# Patient Record
Sex: MALE
[De-identification: names, ages, dates, MRNs, and addresses within clinical notes are randomized per-mention and may not be internally consistent; named-entity substitution may affect disease eponyms.]

---

## 2023-01-27 ENCOUNTER — NON-APPOINTMENT (OUTPATIENT)
Age: 73
End: 2023-01-27

## 2023-01-27 PROBLEM — Z00.00 ENCOUNTER FOR PREVENTIVE HEALTH EXAMINATION: Status: ACTIVE | Noted: 2023-01-27

## 2023-01-30 ENCOUNTER — APPOINTMENT (OUTPATIENT)
Dept: COLORECTAL SURGERY | Facility: CLINIC | Age: 73
End: 2023-01-30
Payer: MEDICARE

## 2023-01-30 ENCOUNTER — NON-APPOINTMENT (OUTPATIENT)
Age: 73
End: 2023-01-30

## 2023-01-30 VITALS
HEART RATE: 65 BPM | DIASTOLIC BLOOD PRESSURE: 80 MMHG | TEMPERATURE: 97.7 F | SYSTOLIC BLOOD PRESSURE: 153 MMHG | HEIGHT: 64 IN | BODY MASS INDEX: 26.12 KG/M2 | WEIGHT: 153 LBS

## 2023-01-30 PROCEDURE — 99205 OFFICE O/P NEW HI 60 MIN: CPT

## 2023-01-30 NOTE — HISTORY OF PRESENT ILLNESS
[FreeTextEntry1] : 73 y/o M presents for initial evaluation of colon CA, referred by Rabbi Duggan\par PMH HLD\par PSH inguinal hernia repair\par (+) Father diagnosed w/ CRC age 70s, also w/ brain CA\par \par Pt underwent colonoscopy w/ Dr. Yasir Awad at [facility] on 1/13/23: Bowel prep adequate\par Impression: \par Medium sized lipoma cecum. Biopsied. \par A large flat polypoid lesion with scarring and central umbilication was found at the splenic flexure, just proximal to a large tattoo. Biopsied. \par 3 mm polyp found in the proximal descending colon, s/p resection.\par Exam otherwise w/o abnormality\par \par Pathology performed [facility]: \par Colon,Splenic flexure, polyp biopsy: Invasive moderately differentiated adenocarcinoma.  \par Tumor arises in a background  of tubular adenoma. \par MSI-stable \par \par CT chest performed at [facility] on 1/26/23\par Severe burden of calcific coronary ahterosclerosis.\par Impression: \par Small solid noncalcified lung nodules are nonspecific, although give the presence of numerous calcified granulomas may be postinflammatory in etiology. 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 No extra colonic spread of tumor. Possible bone metastases left ilium. \par \par Most recent labs (1/23/23) otherwise WNL: Cr 0.94, AST 32, ALT 29, Bilirubin Total 0.4, Alkaline phosphatase 86, Albumin 2.2. Hgb 14.4, HCT 42.5\par \par

## 2023-01-30 NOTE — ASSESSMENT
[FreeTextEntry1] : I had extensive discussion with the patient (60 minutes) regarding the diagnosis and treatment options. I recommended that he consider proceeding with a robotic assisted left hemicolectomy possible extended right hemicolectomy.\par The associated risks, benefits, alternatives of the procedure have been outlined discussed and reviewed with the patient's family. These risks including but not limited to bleeding, infection, anastomotic leak, need for secondary surgery, need for ileostomy or colostomy creation, change in bowel habits,  as well as the risk of heart and lung complications infection and death were detailed. The patient understands these risks and consents the planned procedure. Appropriate  literature regarding surgery and post operative treatment/complications and enhanced recovery pathway has been detailed and reviewed. Consent was obtained. All questions were answered.\par

## 2023-02-03 ENCOUNTER — OUTPATIENT (OUTPATIENT)
Dept: OUTPATIENT SERVICES | Facility: HOSPITAL | Age: 73
LOS: 1 days | End: 2023-02-03
Payer: MEDICARE

## 2023-02-03 ENCOUNTER — RESULT REVIEW (OUTPATIENT)
Age: 73
End: 2023-02-03

## 2023-02-03 DIAGNOSIS — C18.5 MALIGNANT NEOPLASM OF SPLENIC FLEXURE: ICD-10-CM

## 2023-02-03 PROCEDURE — 88321 CONSLTJ&REPRT SLD PREP ELSWR: CPT

## 2023-02-07 LAB — SURGICAL PATHOLOGY STUDY: SIGNIFICANT CHANGE UP

## 2023-02-20 RX ORDER — CIPROFLOXACIN HYDROCHLORIDE 500 MG/1
500 TABLET, FILM COATED ORAL
Qty: 2 | Refills: 0 | Status: ACTIVE | COMMUNITY
Start: 2023-01-30 | End: 1900-01-01

## 2023-02-20 RX ORDER — METRONIDAZOLE 500 MG/1
500 TABLET ORAL
Qty: 6 | Refills: 0 | Status: ACTIVE | COMMUNITY
Start: 2023-01-30 | End: 1900-01-01

## 2023-02-21 ENCOUNTER — TRANSCRIPTION ENCOUNTER (OUTPATIENT)
Age: 73
End: 2023-02-21

## 2023-02-21 VITALS
HEART RATE: 62 BPM | DIASTOLIC BLOOD PRESSURE: 80 MMHG | TEMPERATURE: 98 F | RESPIRATION RATE: 16 BRPM | OXYGEN SATURATION: 96 % | SYSTOLIC BLOOD PRESSURE: 145 MMHG | WEIGHT: 147.71 LBS | HEIGHT: 64 IN

## 2023-02-21 NOTE — PATIENT PROFILE ADULT - FALL HARM RISK - UNIVERSAL INTERVENTIONS
Bed in lowest position, wheels locked, appropriate side rails in place/Call bell, personal items and telephone in reach/Instruct patient to call for assistance before getting out of bed or chair/Non-slip footwear when patient is out of bed/[place] to call system/Physically safe environment - no spills, clutter or unnecessary equipment/Purposeful Proactive Rounding/Room/bathroom lighting operational, light cord in reach

## 2023-02-21 NOTE — H&P ADULT - HISTORY OF PRESENT ILLNESS
This is a 73yo gentleman who underwent surveillance colonoscopy ( last Cscope 3  yrs ago) for polyps.  A large flat polyp was found at the splenic flexure, biopsy   moderately differentiated adenoca. MMR intact. The lesion has been tattooed.  CT abd - no distant disease. CT chest  small non specifc lung nodules    further PETCT showed these to be  non FDG avid nodules.

## 2023-02-21 NOTE — H&P ADULT - ASSESSMENT
This is a 72year old  patient with splenic flexure cancer; moderately differentiated adnoca , MMR intact.   The lesion has been tattooed.  Staging CT scan and PETCT does not show  gross distant disease. He is medically healthy.   He is here for Robotic assisted  Left hemicolectomy  ,possible  extended right hemicolectomy.   1. Type/Screen 2 . Heparin 5000u sc 3. ERP postop

## 2023-02-21 NOTE — H&P ADULT - NSHPPHYSICALEXAM_GEN_ALL_CORE
Gastrointestinal: No abdominal masses. No abdominal tenderness.   Liver:. no hepatosplenomegaly.   Neck: no jugular venous distention.   Respiratory: Normal breath sounds.   Skin:. no rash or lesion.   Neurologic: alert.   Psychiatric: calm.

## 2023-02-21 NOTE — H&P ADULT - REASON FOR ADMISSION
Elective admission for Robotic assist  left hemicolectomy / ? extended right hemicolectomy for Splenic Flexure cancer

## 2023-02-21 NOTE — PRE-OP CHECKLIST - PATIENT'S PERSONAL PROPERTY GIVEN TO
2 bags on unit, valuables with wife/family member/on unit 3 bags on unit, valuables with wife/family member/on unit

## 2023-02-21 NOTE — H&P ADULT - NSHPLABSRESULTS_GEN_ALL_CORE
Cr 0.94, AST 32, ALT 29, Bilirubin Total 0.4, Alkaline phosphatase 86, Albumin 2.2. Hgb 14.4, HCT 42.5

## 2023-02-22 ENCOUNTER — RESULT REVIEW (OUTPATIENT)
Age: 73
End: 2023-02-22

## 2023-02-22 ENCOUNTER — INPATIENT (INPATIENT)
Facility: HOSPITAL | Age: 73
LOS: 1 days | Discharge: HOME | DRG: 330 | End: 2023-02-24
Attending: SURGERY | Admitting: SURGERY
Payer: MEDICARE

## 2023-02-22 ENCOUNTER — APPOINTMENT (OUTPATIENT)
Dept: COLORECTAL SURGERY | Facility: CLINIC | Age: 73
End: 2023-02-22

## 2023-02-22 ENCOUNTER — TRANSCRIPTION ENCOUNTER (OUTPATIENT)
Age: 73
End: 2023-02-22

## 2023-02-22 DIAGNOSIS — E87.1 HYPO-OSMOLALITY AND HYPONATREMIA: ICD-10-CM

## 2023-02-22 DIAGNOSIS — Z88.0 ALLERGY STATUS TO PENICILLIN: ICD-10-CM

## 2023-02-22 DIAGNOSIS — C18.5 MALIGNANT NEOPLASM OF SPLENIC FLEXURE: ICD-10-CM

## 2023-02-22 DIAGNOSIS — Z98.890 OTHER SPECIFIED POSTPROCEDURAL STATES: Chronic | ICD-10-CM

## 2023-02-22 DIAGNOSIS — G47.00 INSOMNIA, UNSPECIFIED: ICD-10-CM

## 2023-02-22 DIAGNOSIS — Z79.899 OTHER LONG TERM (CURRENT) DRUG THERAPY: ICD-10-CM

## 2023-02-22 DIAGNOSIS — E78.5 HYPERLIPIDEMIA, UNSPECIFIED: ICD-10-CM

## 2023-02-22 DIAGNOSIS — R91.8 OTHER NONSPECIFIC ABNORMAL FINDING OF LUNG FIELD: ICD-10-CM

## 2023-02-22 LAB
BLD GP AB SCN SERPL QL: NEGATIVE — SIGNIFICANT CHANGE UP
RH IG SCN BLD-IMP: POSITIVE — SIGNIFICANT CHANGE UP

## 2023-02-22 PROCEDURE — 44204 LAPARO PARTIAL COLECTOMY: CPT | Mod: GC

## 2023-02-22 PROCEDURE — 44213 LAP MOBIL SPLENIC FL ADD-ON: CPT | Mod: GC

## 2023-02-22 PROCEDURE — 88309 TISSUE EXAM BY PATHOLOGIST: CPT | Mod: 26

## 2023-02-22 DEVICE — CLIP APPLIER ETHICON LIGACLIP 11.5" MEDIUM: Type: IMPLANTABLE DEVICE | Site: RIGHT | Status: FUNCTIONAL

## 2023-02-22 DEVICE — XI STAPLER SUREFORM RELOAD 60 WHITE: Type: IMPLANTABLE DEVICE | Site: RIGHT | Status: FUNCTIONAL

## 2023-02-22 DEVICE — XI STAPLER SUREFORM RELOAD 60 BLUE: Type: IMPLANTABLE DEVICE | Site: RIGHT | Status: FUNCTIONAL

## 2023-02-22 RX ORDER — ATORVASTATIN CALCIUM 80 MG/1
20 TABLET, FILM COATED ORAL AT BEDTIME
Refills: 0 | Status: DISCONTINUED | OUTPATIENT
Start: 2023-02-22 | End: 2023-02-24

## 2023-02-22 RX ORDER — ACETAMINOPHEN 500 MG
1000 TABLET ORAL ONCE
Refills: 0 | Status: COMPLETED | OUTPATIENT
Start: 2023-02-22 | End: 2023-02-22

## 2023-02-22 RX ORDER — ACETAMINOPHEN 500 MG
1000 TABLET ORAL EVERY 6 HOURS
Refills: 0 | Status: DISCONTINUED | OUTPATIENT
Start: 2023-02-22 | End: 2023-02-24

## 2023-02-22 RX ORDER — SODIUM CHLORIDE 9 MG/ML
1000 INJECTION, SOLUTION INTRAVENOUS
Refills: 0 | Status: DISCONTINUED | OUTPATIENT
Start: 2023-02-22 | End: 2023-02-24

## 2023-02-22 RX ORDER — HYDRALAZINE HCL 50 MG
5 TABLET ORAL ONCE
Refills: 0 | Status: COMPLETED | OUTPATIENT
Start: 2023-02-22 | End: 2023-02-22

## 2023-02-22 RX ORDER — KETOROLAC TROMETHAMINE 30 MG/ML
15 SYRINGE (ML) INJECTION EVERY 6 HOURS
Refills: 0 | Status: DISCONTINUED | OUTPATIENT
Start: 2023-02-22 | End: 2023-02-24

## 2023-02-22 RX ORDER — ACETAMINOPHEN 500 MG
1000 TABLET ORAL EVERY 6 HOURS
Refills: 0 | Status: DISCONTINUED | OUTPATIENT
Start: 2023-02-22 | End: 2023-02-22

## 2023-02-22 RX ORDER — HEPARIN SODIUM 5000 [USP'U]/ML
5000 INJECTION INTRAVENOUS; SUBCUTANEOUS ONCE
Refills: 0 | Status: COMPLETED | OUTPATIENT
Start: 2023-02-22 | End: 2023-02-22

## 2023-02-22 RX ORDER — FLUMAZENIL 0.1 MG/ML
0.2 VIAL (ML) INTRAVENOUS ONCE
Refills: 0 | Status: COMPLETED | OUTPATIENT
Start: 2023-02-22 | End: 2023-02-22

## 2023-02-22 RX ORDER — NALOXONE HYDROCHLORIDE 4 MG/.1ML
0.04 SPRAY NASAL ONCE
Refills: 0 | Status: COMPLETED | OUTPATIENT
Start: 2023-02-22 | End: 2023-02-22

## 2023-02-22 RX ORDER — HEPARIN SODIUM 5000 [USP'U]/ML
5000 INJECTION INTRAVENOUS; SUBCUTANEOUS EVERY 8 HOURS
Refills: 0 | Status: DISCONTINUED | OUTPATIENT
Start: 2023-02-22 | End: 2023-02-24

## 2023-02-22 RX ORDER — ONDANSETRON 8 MG/1
4 TABLET, FILM COATED ORAL EVERY 6 HOURS
Refills: 0 | Status: DISCONTINUED | OUTPATIENT
Start: 2023-02-22 | End: 2023-02-24

## 2023-02-22 RX ORDER — LANOLIN ALCOHOL/MO/W.PET/CERES
3 CREAM (GRAM) TOPICAL AT BEDTIME
Refills: 0 | Status: COMPLETED | OUTPATIENT
Start: 2023-02-22 | End: 2023-02-22

## 2023-02-22 RX ORDER — ROSUVASTATIN CALCIUM 5 MG/1
1 TABLET ORAL
Qty: 0 | Refills: 0 | DISCHARGE

## 2023-02-22 RX ADMIN — HEPARIN SODIUM 5000 UNIT(S): 5000 INJECTION INTRAVENOUS; SUBCUTANEOUS at 20:01

## 2023-02-22 RX ADMIN — Medication 1000 MILLIGRAM(S): at 23:22

## 2023-02-22 RX ADMIN — Medication 0.2 MILLIGRAM(S): at 12:25

## 2023-02-22 RX ADMIN — Medication 1000 MILLIGRAM(S): at 07:15

## 2023-02-22 RX ADMIN — ATORVASTATIN CALCIUM 20 MILLIGRAM(S): 80 TABLET, FILM COATED ORAL at 21:50

## 2023-02-22 RX ADMIN — Medication 5 MILLIGRAM(S): at 15:08

## 2023-02-22 RX ADMIN — Medication 3 MILLIGRAM(S): at 23:37

## 2023-02-22 RX ADMIN — NALOXONE HYDROCHLORIDE 0.04 MILLIGRAM(S): 4 SPRAY NASAL at 12:30

## 2023-02-22 RX ADMIN — Medication 1000 MILLIGRAM(S): at 18:27

## 2023-02-22 RX ADMIN — HEPARIN SODIUM 5000 UNIT(S): 5000 INJECTION INTRAVENOUS; SUBCUTANEOUS at 07:15

## 2023-02-22 RX ADMIN — Medication 15 MILLIGRAM(S): at 16:10

## 2023-02-22 RX ADMIN — Medication 15 MILLIGRAM(S): at 15:43

## 2023-02-22 RX ADMIN — Medication 15 MILLIGRAM(S): at 22:06

## 2023-02-22 RX ADMIN — Medication 15 MILLIGRAM(S): at 21:51

## 2023-02-22 NOTE — BRIEF OPERATIVE NOTE - NSICDXBRIEFPREOP_GEN_ALL_CORE_FT
PRE-OP DIAGNOSIS:  Colonic mass 22-Feb-2023 11:48:18  Suzanna Herrera  
PRE-OP DIAGNOSIS:  Cancer of splenic flexure 21-Feb-2023 19:13:15  Aleksandar Ventura

## 2023-02-22 NOTE — CHART NOTE - NSCHARTNOTEFT_GEN_A_CORE
Received from OR accompanied by anesthesia. Per report, patient sleepy after extubation.  Nasal trumpet in left nare on arrival to PACU.  Informed by RN that patient not waking up/following commands. Seen at bedside, noted minimal waveform on etCO2, airway maintained with support.  Dr. Farnsworth called immediately to bedside, flumazenil and narcan given per order. Oral airway placed by Dr. Farnsworth. Dr. Mora came to bedside.  Noted improved etCO2 waveforms after administration.  Now opens eyes, moving all extremities.  Team aware of above.  Dispo regional when PACU criteria met.

## 2023-02-22 NOTE — PRE-ANESTHESIA EVALUATION ADULT - NSANTHPMHFT_GEN_ALL_CORE
Cardiac: Positive for HLD. Denies HTN, MI/Angina/Heart Failure, Arrhythmia, Murmur/Valvular Disorder. >4 METS  Pulmonary: Denies COPD, PAULA, Asthma  Renal: Denies kidney dysfunction  Hepatic: Denies liver dysfunction  Gastrointestinal: Positive for malignant neoplasm of splenic flexure. Denies GERD/IBD.  Endocrine: Denies DM or thyroid dysfunction  Neurologic: Denies stroke/seizure history  Hematologic: Denies anemia, blood clotting disorder, blood thinning medication.    PSH: Right inguinal hernia repair

## 2023-02-22 NOTE — BRIEF OPERATIVE NOTE - OPERATION/FINDINGS
Veress needle. Optiview entry. Under visualisation , rest of ports inserted. Bilateral BENJAMIN block. Small bowel stacked away. Patient positioned. Robot docked and targeted. Medial to lateral dissection of descending colon. Splenic mobilisation .  Closure in usual manner.
No qualified resident for bedside assist available. Veress needle access. Optiview placement of 12mm port; rest of ports placed under direct visualization. TAP block. Left colon and splenic flexure mobilized. High ligation of middle colic with Vessel sealer. Proximal and distal margins taken with robotic stapler blue load. transverse to descending colon anastomosis created with robotic stapler white load x 1 fire. Common channel closed with V-lock running suture and Lemberted. Hemostatic. 8cm off midline incision created in RLQ and specimen removed. Closing tray utilized; gowns and gloves changed. Peritoneum closed with 2-0 Vicryl running; fascia closed with #1 PDS running. Closed skin with 3-0 Vicryl deep dermal and 4-0 Monocryl.

## 2023-02-22 NOTE — BRIEF OPERATIVE NOTE - NSICDXBRIEFPROCEDURE_GEN_ALL_CORE_FT
PROCEDURES:  Robot-assisted left hemicolectomy 21-Feb-2023 19:15:29  Aleksandar Ventura  
PROCEDURES:  Robot-assisted left hemicolectomy 21-Feb-2023 19:15:29  Aleksandar Ventura

## 2023-02-22 NOTE — BRIEF OPERATIVE NOTE - NSICDXBRIEFPOSTOP_GEN_ALL_CORE_FT
POST-OP DIAGNOSIS:  Cancer of splenic flexure 21-Feb-2023 19:13:22  Aleksandar Ventura  
POST-OP DIAGNOSIS:  Colonic mass 22-Feb-2023 11:48:30  Suzanna Herrera

## 2023-02-23 LAB
ANION GAP SERPL CALC-SCNC: 13 MMOL/L — SIGNIFICANT CHANGE UP (ref 5–17)
BUN SERPL-MCNC: 7 MG/DL — SIGNIFICANT CHANGE UP (ref 7–23)
CALCIUM SERPL-MCNC: 8.8 MG/DL — SIGNIFICANT CHANGE UP (ref 8.4–10.5)
CHLORIDE SERPL-SCNC: 96 MMOL/L — SIGNIFICANT CHANGE UP (ref 96–108)
CO2 SERPL-SCNC: 25 MMOL/L — SIGNIFICANT CHANGE UP (ref 22–31)
CREAT SERPL-MCNC: 0.84 MG/DL — SIGNIFICANT CHANGE UP (ref 0.5–1.3)
EGFR: 93 ML/MIN/1.73M2 — SIGNIFICANT CHANGE UP
GLUCOSE SERPL-MCNC: 119 MG/DL — HIGH (ref 70–99)
HCT VFR BLD CALC: 36.3 % — LOW (ref 39–50)
HGB BLD-MCNC: 12.1 G/DL — LOW (ref 13–17)
MAGNESIUM SERPL-MCNC: 1.6 MG/DL — SIGNIFICANT CHANGE UP (ref 1.6–2.6)
MCHC RBC-ENTMCNC: 28.4 PG — SIGNIFICANT CHANGE UP (ref 27–34)
MCHC RBC-ENTMCNC: 33.3 GM/DL — SIGNIFICANT CHANGE UP (ref 32–36)
MCV RBC AUTO: 85.2 FL — SIGNIFICANT CHANGE UP (ref 80–100)
NRBC # BLD: 0 /100 WBCS — SIGNIFICANT CHANGE UP (ref 0–0)
PHOSPHATE SERPL-MCNC: 2.9 MG/DL — SIGNIFICANT CHANGE UP (ref 2.5–4.5)
PLATELET # BLD AUTO: 129 K/UL — LOW (ref 150–400)
POTASSIUM SERPL-MCNC: 3.2 MMOL/L — LOW (ref 3.5–5.3)
POTASSIUM SERPL-SCNC: 3.2 MMOL/L — LOW (ref 3.5–5.3)
RBC # BLD: 4.26 M/UL — SIGNIFICANT CHANGE UP (ref 4.2–5.8)
RBC # FLD: 12.8 % — SIGNIFICANT CHANGE UP (ref 10.3–14.5)
SODIUM SERPL-SCNC: 134 MMOL/L — LOW (ref 135–145)
WBC # BLD: 8.71 K/UL — SIGNIFICANT CHANGE UP (ref 3.8–10.5)
WBC # FLD AUTO: 8.71 K/UL — SIGNIFICANT CHANGE UP (ref 3.8–10.5)

## 2023-02-23 RX ORDER — MAGNESIUM SULFATE 500 MG/ML
2 VIAL (ML) INJECTION EVERY 6 HOURS
Refills: 0 | Status: COMPLETED | OUTPATIENT
Start: 2023-02-23 | End: 2023-02-23

## 2023-02-23 RX ORDER — HYDROMORPHONE HYDROCHLORIDE 2 MG/ML
0.25 INJECTION INTRAMUSCULAR; INTRAVENOUS; SUBCUTANEOUS ONCE
Refills: 0 | Status: DISCONTINUED | OUTPATIENT
Start: 2023-02-23 | End: 2023-02-23

## 2023-02-23 RX ORDER — POTASSIUM CHLORIDE 20 MEQ
40 PACKET (EA) ORAL ONCE
Refills: 0 | Status: COMPLETED | OUTPATIENT
Start: 2023-02-23 | End: 2023-02-23

## 2023-02-23 RX ADMIN — Medication 15 MILLIGRAM(S): at 10:31

## 2023-02-23 RX ADMIN — Medication 1000 MILLIGRAM(S): at 13:20

## 2023-02-23 RX ADMIN — Medication 15 MILLIGRAM(S): at 23:15

## 2023-02-23 RX ADMIN — HEPARIN SODIUM 5000 UNIT(S): 5000 INJECTION INTRAVENOUS; SUBCUTANEOUS at 05:19

## 2023-02-23 RX ADMIN — Medication 40 MILLIEQUIVALENT(S): at 10:31

## 2023-02-23 RX ADMIN — Medication 1000 MILLIGRAM(S): at 00:24

## 2023-02-23 RX ADMIN — Medication 1000 MILLIGRAM(S): at 05:19

## 2023-02-23 RX ADMIN — Medication 1000 MILLIGRAM(S): at 23:03

## 2023-02-23 RX ADMIN — SODIUM CHLORIDE 40 MILLILITER(S): 9 INJECTION, SOLUTION INTRAVENOUS at 19:43

## 2023-02-23 RX ADMIN — Medication 15 MILLIGRAM(S): at 16:31

## 2023-02-23 RX ADMIN — Medication 40 MILLIEQUIVALENT(S): at 13:21

## 2023-02-23 RX ADMIN — Medication 1000 MILLIGRAM(S): at 19:37

## 2023-02-23 RX ADMIN — HYDROMORPHONE HYDROCHLORIDE 0.25 MILLIGRAM(S): 2 INJECTION INTRAMUSCULAR; INTRAVENOUS; SUBCUTANEOUS at 05:19

## 2023-02-23 RX ADMIN — Medication 15 MILLIGRAM(S): at 22:15

## 2023-02-23 RX ADMIN — Medication 25 GRAM(S): at 10:31

## 2023-02-23 RX ADMIN — Medication 25 GRAM(S): at 16:32

## 2023-02-23 RX ADMIN — ATORVASTATIN CALCIUM 20 MILLIGRAM(S): 80 TABLET, FILM COATED ORAL at 22:15

## 2023-02-23 RX ADMIN — HEPARIN SODIUM 5000 UNIT(S): 5000 INJECTION INTRAVENOUS; SUBCUTANEOUS at 13:21

## 2023-02-23 RX ADMIN — HEPARIN SODIUM 5000 UNIT(S): 5000 INJECTION INTRAVENOUS; SUBCUTANEOUS at 19:37

## 2023-02-24 ENCOUNTER — TRANSCRIPTION ENCOUNTER (OUTPATIENT)
Age: 73
End: 2023-02-24

## 2023-02-24 VITALS
RESPIRATION RATE: 18 BRPM | DIASTOLIC BLOOD PRESSURE: 81 MMHG | SYSTOLIC BLOOD PRESSURE: 165 MMHG | OXYGEN SATURATION: 96 % | HEART RATE: 69 BPM | TEMPERATURE: 98 F

## 2023-02-24 LAB
ANION GAP SERPL CALC-SCNC: 8 MMOL/L — SIGNIFICANT CHANGE UP (ref 5–17)
BUN SERPL-MCNC: 7 MG/DL — SIGNIFICANT CHANGE UP (ref 7–23)
CALCIUM SERPL-MCNC: 8.5 MG/DL — SIGNIFICANT CHANGE UP (ref 8.4–10.5)
CHLORIDE SERPL-SCNC: 104 MMOL/L — SIGNIFICANT CHANGE UP (ref 96–108)
CO2 SERPL-SCNC: 27 MMOL/L — SIGNIFICANT CHANGE UP (ref 22–31)
CREAT SERPL-MCNC: 0.95 MG/DL — SIGNIFICANT CHANGE UP (ref 0.5–1.3)
EGFR: 86 ML/MIN/1.73M2 — SIGNIFICANT CHANGE UP
GLUCOSE SERPL-MCNC: 109 MG/DL — HIGH (ref 70–99)
HCT VFR BLD CALC: 36.2 % — LOW (ref 39–50)
HGB BLD-MCNC: 12 G/DL — LOW (ref 13–17)
MAGNESIUM SERPL-MCNC: 2.4 MG/DL — SIGNIFICANT CHANGE UP (ref 1.6–2.6)
MCHC RBC-ENTMCNC: 28.8 PG — SIGNIFICANT CHANGE UP (ref 27–34)
MCHC RBC-ENTMCNC: 33.1 GM/DL — SIGNIFICANT CHANGE UP (ref 32–36)
MCV RBC AUTO: 86.8 FL — SIGNIFICANT CHANGE UP (ref 80–100)
NRBC # BLD: 0 /100 WBCS — SIGNIFICANT CHANGE UP (ref 0–0)
PHOSPHATE SERPL-MCNC: 1.2 MG/DL — LOW (ref 2.5–4.5)
PLATELET # BLD AUTO: 123 K/UL — LOW (ref 150–400)
POTASSIUM SERPL-MCNC: 3.5 MMOL/L — SIGNIFICANT CHANGE UP (ref 3.5–5.3)
POTASSIUM SERPL-SCNC: 3.5 MMOL/L — SIGNIFICANT CHANGE UP (ref 3.5–5.3)
RBC # BLD: 4.17 M/UL — LOW (ref 4.2–5.8)
RBC # FLD: 13.3 % — SIGNIFICANT CHANGE UP (ref 10.3–14.5)
SODIUM SERPL-SCNC: 139 MMOL/L — SIGNIFICANT CHANGE UP (ref 135–145)
SURGICAL PATHOLOGY STUDY: SIGNIFICANT CHANGE UP
WBC # BLD: 6.51 K/UL — SIGNIFICANT CHANGE UP (ref 3.8–10.5)
WBC # FLD AUTO: 6.51 K/UL — SIGNIFICANT CHANGE UP (ref 3.8–10.5)

## 2023-02-24 PROCEDURE — S2900: CPT

## 2023-02-24 PROCEDURE — C1889: CPT

## 2023-02-24 PROCEDURE — 88309 TISSUE EXAM BY PATHOLOGIST: CPT

## 2023-02-24 PROCEDURE — 84100 ASSAY OF PHOSPHORUS: CPT

## 2023-02-24 PROCEDURE — 86850 RBC ANTIBODY SCREEN: CPT

## 2023-02-24 PROCEDURE — 83735 ASSAY OF MAGNESIUM: CPT

## 2023-02-24 PROCEDURE — 80048 BASIC METABOLIC PNL TOTAL CA: CPT

## 2023-02-24 PROCEDURE — 82962 GLUCOSE BLOOD TEST: CPT

## 2023-02-24 PROCEDURE — 36415 COLL VENOUS BLD VENIPUNCTURE: CPT

## 2023-02-24 PROCEDURE — 85027 COMPLETE CBC AUTOMATED: CPT

## 2023-02-24 PROCEDURE — 86900 BLOOD TYPING SEROLOGIC ABO: CPT

## 2023-02-24 PROCEDURE — 86901 BLOOD TYPING SEROLOGIC RH(D): CPT

## 2023-02-24 RX ORDER — POTASSIUM PHOSPHATE, MONOBASIC POTASSIUM PHOSPHATE, DIBASIC 236; 224 MG/ML; MG/ML
21 INJECTION, SOLUTION INTRAVENOUS ONCE
Refills: 0 | Status: COMPLETED | OUTPATIENT
Start: 2023-02-24 | End: 2023-02-24

## 2023-02-24 RX ORDER — SODIUM,POTASSIUM PHOSPHATES 278-250MG
1 POWDER IN PACKET (EA) ORAL ONCE
Refills: 0 | Status: COMPLETED | OUTPATIENT
Start: 2023-02-24 | End: 2023-02-24

## 2023-02-24 RX ORDER — POTASSIUM CHLORIDE 20 MEQ
40 PACKET (EA) ORAL ONCE
Refills: 0 | Status: COMPLETED | OUTPATIENT
Start: 2023-02-24 | End: 2023-02-24

## 2023-02-24 RX ORDER — ACETAMINOPHEN 500 MG
650 TABLET ORAL EVERY 6 HOURS
Refills: 0 | Status: DISCONTINUED | OUTPATIENT
Start: 2023-02-24 | End: 2023-02-24

## 2023-02-24 RX ORDER — OXYCODONE HYDROCHLORIDE 5 MG/1
1 TABLET ORAL
Qty: 8 | Refills: 0
Start: 2023-02-24 | End: 2023-02-25

## 2023-02-24 RX ORDER — DOCUSATE SODIUM 100 MG
1 CAPSULE ORAL
Qty: 14 | Refills: 0
Start: 2023-02-24 | End: 2023-03-02

## 2023-02-24 RX ADMIN — HEPARIN SODIUM 5000 UNIT(S): 5000 INJECTION INTRAVENOUS; SUBCUTANEOUS at 05:14

## 2023-02-24 RX ADMIN — Medication 15 MILLIGRAM(S): at 05:14

## 2023-02-24 RX ADMIN — Medication 1000 MILLIGRAM(S): at 00:03

## 2023-02-24 RX ADMIN — Medication 40 MILLIEQUIVALENT(S): at 11:04

## 2023-02-24 RX ADMIN — Medication 1 PACKET(S): at 14:08

## 2023-02-24 RX ADMIN — Medication 15 MILLIGRAM(S): at 11:04

## 2023-02-24 RX ADMIN — Medication 1000 MILLIGRAM(S): at 05:16

## 2023-02-24 RX ADMIN — Medication 15 MILLIGRAM(S): at 06:14

## 2023-02-24 RX ADMIN — HEPARIN SODIUM 5000 UNIT(S): 5000 INJECTION INTRAVENOUS; SUBCUTANEOUS at 14:08

## 2023-02-24 RX ADMIN — Medication 1000 MILLIGRAM(S): at 06:16

## 2023-02-24 RX ADMIN — POTASSIUM PHOSPHATE, MONOBASIC POTASSIUM PHOSPHATE, DIBASIC 62.5 MILLIMOLE(S): 236; 224 INJECTION, SOLUTION INTRAVENOUS at 11:27

## 2023-02-24 NOTE — PROGRESS NOTE ADULT - SUBJECTIVE AND OBJECTIVE BOX
Procedure: RA left pamela  Surgeon: Latonya    S: Took some time to awaken the patient, Narcan was given. Pt seems more awake. Pt seen by Dr. Hanks whom allowed patient to go to regional unit. Pt has no complaints. Denies CP, SOB, CASAS, calf tenderness. Pain controlled with medication.    O:  T(C): 36.3 (02-22-23 @ 12:15), Max: 36.3 (02-22-23 @ 12:15)  T(F): 97.4 (02-22-23 @ 12:15), Max: 97.4 (02-22-23 @ 12:15)  HR: 68 (02-22-23 @ 14:00) (63 - 68)  BP: 171/88 (02-22-23 @ 14:00) (156/84 - 183/89)  RR: 17 (02-22-23 @ 14:00) (16 - 22)  SpO2: 100% (02-22-23 @ 14:00) (94% - 100%)  Wt(kg): --            Gen: NAD, resting comfortably in bed  C/V: NSR  Pulm: Nonlabored breathing, no respiratory distress  Abd: soft, NT/ND Incision: CDI  Extrem: WWP, no calf edema, SCDs in place      A/P: 72yMale s/p left pamela  Diet: CLD  IVF: LR@40  Pain/nausea control  DVT ppx: SCDs, SQH  Hernandez, follow up UOP
INTERVAL HPI/OVERNIGHT EVENTS: +f/+bm, -n/-v, feels well     STATUS POST:  2/22: robotic assisted left hemicolectomy    POST OPERATIVE DAY #: 2    SUBJECTIVE: Pt seen and examined at bedside this am by surgery team. No acute complaints. Tolerating diet, pain well controlled. +F/+BMs. Denies f/n/v/cp/sob.    MEDICATIONS  (STANDING):  acetaminophen    Suspension .. 1000 milliGRAM(s) Oral every 6 hours  atorvastatin 20 milliGRAM(s) Oral at bedtime  heparin   Injectable 5000 Unit(s) SubCutaneous every 8 hours  ketorolac   Injectable 15 milliGRAM(s) IV Push every 6 hours  lactated ringers. 1000 milliLiter(s) (40 mL/Hr) IV Continuous <Continuous>  potassium chloride   Powder 40 milliEquivalent(s) Oral once  potassium phosphate IVPB 21 milliMole(s) IV Intermittent once    MEDICATIONS  (PRN):  ondansetron Injectable 4 milliGRAM(s) IV Push every 6 hours PRN Nausea and/or Vomiting    Vital Signs Last 24 Hrs  T(C): 37.3 (24 Feb 2023 05:00), Max: 37.3 (24 Feb 2023 05:00)  T(F): 99.1 (24 Feb 2023 05:00), Max: 99.1 (24 Feb 2023 05:00)  HR: 75 (24 Feb 2023 05:00) (58 - 75)  BP: 146/81 (24 Feb 2023 05:00) (119/72 - 146/81)  BP(mean): 103 (24 Feb 2023 05:00) (103 - 103)  RR: 17 (24 Feb 2023 05:00) (16 - 17)  SpO2: 91% (24 Feb 2023 05:00) (91% - 95%)    Parameters below as of 24 Feb 2023 05:00  Patient On (Oxygen Delivery Method): room air    PHYSICAL EXAM:    Constitutional: A&Ox3, NAD    Respiratory: non labored breathing, no respiratory distress    Cardiovascular: NSR, RRR    Gastrointestinal: abdomen soft, mildly distended-improving, appropriately ttp to incisions. Dressings c/d/i    Extremities: wwp, no calf tenderness or edema. SCDs in place    I&O's Detail    23 Feb 2023 07:01 - 24 Feb 2023 07:00  --------------------------------------------------------  IN:    IV PiggyBack: 100 mL    Lactated Ringers: 600 mL    Oral Fluid: 1560 mL  Total IN: 2260 mL    OUT:    Indwelling Catheter - Urethral (mL): 325 mL    Voided (mL): 1510 mL  Total OUT: 1835 mL    Total NET: 425 mL      24 Feb 2023 07:01  -  24 Feb 2023 10:22  --------------------------------------------------------  IN:  Total IN: 0 mL    OUT:    Voided (mL): 450 mL  Total OUT: 450 mL    Total NET: -450 mL          LABS:                        12.0   6.51  )-----------( 123      ( 24 Feb 2023 08:28 )             36.2     02-24    139  |  104  |  7   ----------------------------<  109<H>  3.5   |  27  |  0.95    Ca    8.5      24 Feb 2023 08:28  Phos  1.2     02-24  Mg     2.4     02-24            RADIOLOGY & ADDITIONAL STUDIES:
INTERVAL HPI/OVERNIGHT EVENTS: latrice CLD, -n/-v, -bf, OOBA, changed PO tylenol to suspension, melatonin x1    STATUS POST: 2/22: robotic assisted left hemicolectomy    POST OPERATIVE DAY #: 1    SUBJECTIVE: Pt seen and examined at bedside this am by surgery team. No acute complaints. Tolerating diet, pain well controlled. -F/-BM. Denies f/n/v/cp/sob.    MEDICATIONS  (STANDING):  acetaminophen    Suspension .. 1000 milliGRAM(s) Oral every 6 hours  atorvastatin 20 milliGRAM(s) Oral at bedtime  heparin   Injectable 5000 Unit(s) SubCutaneous every 8 hours  ketorolac   Injectable 15 milliGRAM(s) IV Push every 6 hours  lactated ringers. 1000 milliLiter(s) (40 mL/Hr) IV Continuous <Continuous>  magnesium sulfate  IVPB 2 Gram(s) IV Intermittent every 6 hours  potassium chloride   Powder 40 milliEquivalent(s) Oral once  potassium chloride   Powder 40 milliEquivalent(s) Oral once    MEDICATIONS  (PRN):  ondansetron Injectable 4 milliGRAM(s) IV Push every 6 hours PRN Nausea and/or Vomiting    Vital Signs Last 24 Hrs  T(C): 37.4 (23 Feb 2023 04:54), Max: 37.4 (23 Feb 2023 04:54)  T(F): 99.3 (23 Feb 2023 04:54), Max: 99.3 (23 Feb 2023 04:54)  HR: 71 (23 Feb 2023 04:54) (63 - 87)  BP: 126/63 (23 Feb 2023 04:54) (126/63 - 183/89)  BP(mean): 113 (22 Feb 2023 16:15) (110 - 130)  RR: 17 (23 Feb 2023 04:54) (16 - 22)  SpO2: 94% (23 Feb 2023 04:54) (94% - 100%)    Parameters below as of 23 Feb 2023 04:54  Patient On (Oxygen Delivery Method): room air    PHYSICAL EXAM:    Constitutional: A&Ox3, NAD    Respiratory: non labored breathing, no respiratory distress    Cardiovascular: NSR, RRR    Gastrointestinal: abdomen soft, nd, appropriately ttp to incisions. Dressings c/d/i    Genitourinary: Hernandez in place draining clear yellow urine     Extremities: wwp, no calf tenderness or edema. SCDs in place    I&O's Detail    22 Feb 2023 07:01  -  23 Feb 2023 07:00  --------------------------------------------------------  IN:    Lactated Ringers: 640 mL  Total IN: 640 mL    OUT:    Indwelling Catheter - Urethral (mL): 2875 mL  Total OUT: 2875 mL    Total NET: -2235 mL          LABS:                        12.1   8.71  )-----------( 129      ( 23 Feb 2023 05:30 )             36.3     02-23    134<L>  |  96  |  7   ----------------------------<  119<H>  3.2<L>   |  25  |  0.84    Ca    8.8      23 Feb 2023 05:30  Phos  2.9     02-23  Mg     1.6     02-23            RADIOLOGY & ADDITIONAL STUDIES:

## 2023-02-24 NOTE — DISCHARGE NOTE PROVIDER - HOSPITAL COURSE
72M PMH splenic flexure cancer; moderately differentiated adenoca, Staging CT scan and PETCT does not show gross distant disease, presents to [facility] on 2/22/23 for elective surgery. Taken to the OR on 2/22 for robot assisted left hemicolectomy. Transferred to PACU in stable condition. No perioperative complications noted. Diet advanced as tolerated and per return of bowel function. At time of discharge pt is tolerating diet, pain well controlled, pt is ambulating/voiding freely, having adequate bowel function. Pt is HD stable and medically ready for discharge.

## 2023-02-24 NOTE — DISCHARGE NOTE NURSING/CASE MANAGEMENT/SOCIAL WORK - NSDCPNINST_GEN_ALL_CORE
Call Dr. Hanks if you have any questions or concerns. Educational material given: [employer] Patient Education- Surgical Wound Discharge Instructions.

## 2023-02-24 NOTE — PROGRESS NOTE ADULT - REASON FOR ADMISSION
Elective admission for Robotic assist  left hemicolectomy / ? extended right hemicolectomy for Splenic Flexure cancer
Robotic assist  left hemicolectomy
Elective admission for Robotic assist  left hemicolectomy / ? extended right hemicolectomy for Splenic Flexure cancer

## 2023-02-24 NOTE — DISCHARGE NOTE PROVIDER - NSDCFUADDINST_GEN_ALL_CORE_FT
Follow up with Dr. Hanks in 1-2 weeks. Call the office at [phone] to schedule your appointment. You may shower; soap and water over incision sites. Do not scrub. Pat dry when done. No tub bathing or swimming until cleared. Keep incision sites out of the sun as scars will darken. Ambulate as tolerated, but no heavy lifting (>10lbs) or strenuous exercise. You should be urinating at least 3-4x per day. Call the office if you experience increasing abdominal pain, nausea, vomiting, or temperature >101 F.    Warning Signs:  Please call your doctor or nurse practitioner if you experience the following:  *You experience new chest pain, pressure, squeezing or tightness.  *New or worsening cough, shortness of breath, or wheeze.  *If you are vomiting and cannot keep down fluids or your medications.  *You are getting dehydrated due to continued vomiting, diarrhea, or other reasons. Signs of dehydration include dry mouth, rapid heartbeat, or feeling dizzy or faint when standing.  *You see blood or dark/black material when you vomit or have a bowel movement.  *You experience burning when you urinate, have blood in your urine, or experience a discharge.  *Your pain is not improving within 8-12 hours or is not gone within 24 hours. Call or return immediately if your pain is getting worse, changes location, or moves to your chest or back.  *You have shaking chills, or fever greater than 101.5 degrees Fahrenheit or 38 degrees Celsius.  *Any change in your symptoms, or any new symptoms that concern you.    New medications:   Take oxycodone as directed for severe pain. You may also take tylenol with oxycodone or in lieu of, 1000mg every 6 hours as needed for pain. Do not exceed 4000mg of tylenol in 24 hours. Take colace as needed for constipation.

## 2023-02-24 NOTE — DISCHARGE NOTE PROVIDER - NSDCMRMEDTOKEN_GEN_ALL_CORE_FT
Colace 100 mg oral capsule: 1 cap(s) orally 2 times a day, As Needed -for constipation   oxyCODONE 5 mg oral tablet: 1 tab(s) orally every 6 hours, As Needed -for severe pain MDD:4 tablets   rosuvastatin 5 mg oral tablet: 1 tab(s) orally once a day

## 2023-02-24 NOTE — DISCHARGE NOTE NURSING/CASE MANAGEMENT/SOCIAL WORK - PATIENT PORTAL LINK FT
You can access the FollowMyHealth Patient Portal offered by [facility] by registering at the following website: [email or web address]. By joining [facility]’s FollowMyHealth portal, you will also be able to view your health information using other applications (apps) compatible with our system.

## 2023-02-24 NOTE — DISCHARGE NOTE PROVIDER - CARE PROVIDER_API CALL
Mg Hanks)  ColonRectal Surgery; Surgery  [facility], 2nd Floor  [place], [place] [address]  Phone: [phone]  Fax: [phone]  Follow Up Time:

## 2023-02-24 NOTE — DISCHARGE NOTE PROVIDER - INSTRUCTIONS
Please continue a LOW-FIBER DIET. Listed below are some foods you may eat and those you should avoid.   --Allowed foods:  White bread without nuts and seeds  White rice, plain white pasta, and crackers  Refined hot cereals, such as Cream of Wheat, or cold cereals with less than 1 gram of fiber per serving  Pancakes or waffles made from white refined flour  Most canned or well-cooked vegetables and fruits without skins or seeds  Fruit and vegetable juice with little or no pulp, fruit-flavored drinks, and flavored abernathy  Tender meat, poultry, fish, eggs and tofu  Milk and foods made from milk — such as yogurt, pudding, ice cream, cheeses and sour cream — if tolerated  Butter, margarine, oils and salad dressings without seeds  --Foods to avoid:  Whole-wheat or whole-grain breads, cereals and pasta  Brown or wild rice and other whole grains, such as oats, kasha, barley and quinoa  Dried fruits and prune juice  Raw fruit, including those with seeds, skin or membranes, such as berries; Raw or undercooked vegetables, including corn; Dried beans, peas and lentils; Seeds and nuts and foods containing them, including peanut butter and other nut butters; Coconut; Popcorn

## 2023-02-24 NOTE — PROGRESS NOTE ADULT - ASSESSMENT
72 year old male with PMH of hyperlipidemia, colon mass now s/p robotic assisted left hemicolectomy 2/22.    LFD ([place]), IVF   Pain/nausea control prn  SQH/SCDs  OOBA/IS  Likely d/c home today
72 year old male with PMH of hyperlipidemia, colon mass now s/p robotic assisted left hemicolectomy 2/22.    CLD ([place]), IVF   Pain/nausea control prn  SQH/SCDs  OOBA/IS  Removed streeter, TOV pending

## 2023-02-24 NOTE — DISCHARGE NOTE NURSING/CASE MANAGEMENT/SOCIAL WORK - NSDCPEFALRISK_GEN_ALL_CORE
For information on Fall & Injury Prevention, visit: [email or web address] OR  [email or web address] OR  https://www.cdc.gov/steadi/patient.html

## 2023-03-10 ENCOUNTER — APPOINTMENT (OUTPATIENT)
Dept: COLORECTAL SURGERY | Facility: CLINIC | Age: 73
End: 2023-03-10
Payer: MEDICARE

## 2023-03-10 VITALS
BODY MASS INDEX: 24.92 KG/M2 | DIASTOLIC BLOOD PRESSURE: 81 MMHG | HEART RATE: 67 BPM | HEIGHT: 64 IN | SYSTOLIC BLOOD PRESSURE: 182 MMHG | TEMPERATURE: 97.4 F | WEIGHT: 146 LBS

## 2023-03-10 PROBLEM — C18.5 MALIGNANT NEOPLASM OF SPLENIC FLEXURE: Chronic | Status: ACTIVE | Noted: 2023-02-21

## 2023-03-10 PROBLEM — E78.5 HYPERLIPIDEMIA, UNSPECIFIED: Chronic | Status: ACTIVE | Noted: 2023-02-21

## 2023-03-10 PROCEDURE — 99024 POSTOP FOLLOW-UP VISIT: CPT

## 2023-03-10 NOTE — HISTORY OF PRESENT ILLNESS
[FreeTextEntry1] : 71 y/o M presents for first post operative follow up evaluation of pT1N0 splenic flexure moderately differentiated adeno CA, s/p extended left hemicolectomy 2/22/23. \par \par PMH HLD\par PSH inguinal hernia repair\par (+) Father diagnosed w/ CRC age 70s, also w/ brain CA\par \par \par Surgical Pathology Report - Auth (Verified)\par \par Specimen(s) Submitted\par 1  Left colon\par \par Final Diagnosis\par 1.  Left colectomy:\par -   Moderate to poorly differentiated colonic adenocarcinoma (1.0 cm)\par arising in association with a tubular adenoma which infiltrates into the\par submucosa\par \par -   Colonic resection margins negative for tumor\par -   23 lymph nodes negative for tumor (0/23).\par -   See synoptic report\par Verified by: Helena Aguilar MD\par (Electronic Signature)\par Reported on: 02/24/23 14:20 EST, [facility], [facility] [address] [place] [address],\[place] [address]\par Phone: [phone]   Fax: [phone]\par _________________________________________________________________\par \par \par Comment\par mol 1d,1e\par \par Synoptic Summary\par 1: Colon and Rectum - Resection\par Specimen\par Procedure:  Left hemicolectomy\par Tumor\par Tumor Site:  Descending colon\par Histologic Type:   Adenocarcinoma\par Histologic Grade:   G3, poorly differentiated\par Tumor Size:  1.0 Centimeters (cm)\par Tumor Extent:   Invades submucosa\par Macroscopic Tumor Perforation:   Not identified\par Lymphovascular Invasion:   Not identified\par Perineural Invasion:   Not identified\par Number of Tumor Buds:   20 per 'hotspot' field\par Tumor Bud Score:   High (10 or more)\par Treatment Effect:   No known presurgical therapy\par Margins\par Margin Status for Invasive Carcinoma:    All margins negative for\par invasive carcinoma\par Margin Status for Non-Invasive Tumor:    All margins negative for\par high-grade dysplasia / intramucosal carcinoma and low-grade\par dysplasia\par Regional Lymph Nodes\par Regional Lymph Node Status:   All regional lymph nodes negative for\par tumor\par Number of Lymph Nodes Examined:   23\par Tumor Deposits:   Not identified\par Pathologic Stage Classification (pTNM, AJCC 8th Edition)\par pT Category:   pT1\par pN Category:   pN0\par \par CAP eCC 2022 Q2 Release\par \par \par Clinical Information\par Colon cancer\par \par Gross Description\par Received:  Fresh labeled  "left colon", consisting of left colon\par Integrity:  Intact\par Orientation:  None provided\par One End:  Stapled, 4.5 cm in circumference\par Opposing End:  Stapled, 5 cm in circumference\par Colon\par Length:  33 cm\par Colonic Circumference:   4.5-6.2 cm\par Mesenteric Width:  0.7 cm\par Wall Thickness:  0.3 cm\par Inking\par Serosa:  Black\par Area of Interest -  mass\par Size:  1 x 0.5 cm, ulcerated, retracted\par Location:  Desending colon\par % Circumferential Involvement:   20%\par Overlying Serosa:  Contracted\par Cut Surface:  White and firm\par Extension:  Confined to the mucosa\par Distance to Margin at One End:   4.2 cm\par Distance to Margin at Opposing End:   27 cm\par Distance to Additional Margins/Landmarks\par Serosa:  1.5 cm\par Mesenteric:  2.0 cm\par Remaining Mucosa:  Pink-tan and unremarkable\par Remaining Serosa:  Pink-tan, smooth and unremarkable\par \par Lumen:  No abnormality\par Omentum:  Not present\par Attached Mesentery:  Yellow, soft and intact\par Possible Lymph Node(s):   Multiple , 0.3 x 0.3 x 0.2 cm noted in greatest\par dimension\par Cut surfaces:  Unremarkable\par Submitted:  Representative sections in 23 cassettes:\par 1A: One end, shave, closest end\par 1B: Opposite end, shave\par 1C: Mesenteric margin shave\par 1D-1H: Mass\par 1I: Uninvolved mucosa\par 1J: Nine possible lymph nodes\par 1K: Eight possible lymph nodes\par 1L-1W: Fat tissue\par \par \par Recovered well from surgery.  Abdomen is regular.  No significant incisional pain.  Good energy and appetite.\par  \par

## 2023-03-10 NOTE — ASSESSMENT
[FreeTextEntry1] : Stage I splenic flexure cancer.  Status post left hemicolectomy.\par \par Recovered well.\par \par Advised liberalizing diet and activity.\par \par Return to primary GI in 1 year for surveillance colonoscopy.\par \par Recommend follow-up with our office in 1 year.

## 2024-04-01 ENCOUNTER — APPOINTMENT (OUTPATIENT)
Dept: COLORECTAL SURGERY | Facility: CLINIC | Age: 74
End: 2024-04-01
Payer: MEDICARE

## 2024-04-01 VITALS
BODY MASS INDEX: 26.46 KG/M2 | HEIGHT: 64 IN | DIASTOLIC BLOOD PRESSURE: 87 MMHG | WEIGHT: 155 LBS | HEART RATE: 83 BPM | TEMPERATURE: 98 F | SYSTOLIC BLOOD PRESSURE: 171 MMHG

## 2024-04-01 DIAGNOSIS — Z78.9 OTHER SPECIFIED HEALTH STATUS: ICD-10-CM

## 2024-04-01 DIAGNOSIS — Z80.0 FAMILY HISTORY OF MALIGNANT NEOPLASM OF DIGESTIVE ORGANS: ICD-10-CM

## 2024-04-01 DIAGNOSIS — E78.5 HYPERLIPIDEMIA, UNSPECIFIED: ICD-10-CM

## 2024-04-01 DIAGNOSIS — C18.5 MALIGNANT NEOPLASM OF SPLENIC FLEXURE: ICD-10-CM

## 2024-04-01 DIAGNOSIS — Z80.8 FAMILY HISTORY OF MALIGNANT NEOPLASM OF OTHER ORGANS OR SYSTEMS: ICD-10-CM

## 2024-04-01 PROCEDURE — 99214 OFFICE O/P EST MOD 30 MIN: CPT

## 2024-04-01 NOTE — PHYSICAL EXAM
[Abdomen Masses] : No abdominal masses [Abdomen Tenderness] : ~T No ~M abdominal tenderness [No HSM] : no hepatosplenomegaly [JVD] : no jugular venous distention  [Normal Breath Sounds] : Normal breath sounds [No Rash or Lesion] : No rash or lesion [Alert] : alert [Calm] : calm [de-identified] : Abdomen is soft, nontender, nondistended. Incisions are well healed. No hernia or masses\par

## 2024-04-01 NOTE — ASSESSMENT
[FreeTextEntry1] : Poorly differentiated T1 N0 stage I colon cancer.  Status post resection February 23.  Clinically KATHLEEN. Labs drawn today. Advised CT scan given high risk of recurrent disease with poorly differentiation and high tumor budding score.

## 2024-04-01 NOTE — HISTORY OF PRESENT ILLNESS
[FreeTextEntry1] : 74 y/o M with history of colon ca, s/p extended left hemicolectomy 2/22/23, path c/w pT1N0 splenic flexure moderately to poorly differentiated adenocarcinoma w/ High tumor bud score presents for annual follow up   PMH HLD PS inguinal hernia repair (+) Father diagnosed w/ CRC age 70s, also w/ brain CA  Initial CEA 2.7 as part of pre-op clearance, hbt/hct 14.9/42.9, LFTs WNL (2/9/23) Surgical Pathology Report - Auth (Verified) Specimen(s) Submitted 1 Left colon Final Diagnosis 1. Left colectomy: - Moderate to poorly differentiated colonic adenocarcinoma (1.0 cm) arising in association with a tubular adenoma which infiltrates into the submucosa - Colonic resection margins negative for tumor - 23 lymph nodes negative for tumor (0/23). - See synoptic report  Comment mol 1d,1e Synoptic Summary 1: Colon and Rectum - Resection Specimen Procedure: Left hemicolectomy Tumor Tumor Site: Descending colon Histologic Type: Adenocarcinoma Histologic Grade: G3, poorly differentiated Tumor Size: 1.0 Centimeters (cm) Tumor Extent: Invades submucosa Macroscopic Tumor Perforation: Not identified Lymphovascular Invasion: Not identified Perineural Invasion: Not identified Number of Tumor Buds: 20 per 'hotspot' field Tumor Bud Score: High (10 or more) Treatment Effect: No known presurgical therapy Margins Margin Status for Invasive Carcinoma: All margins negative for invasive carcinoma Margin Status for Non-Invasive Tumor: All margins negative for high-grade dysplasia / intramucosal carcinoma and low-grade dysplasia Regional Lymph Nodes Regional Lymph Node Status: All regional lymph nodes negative for tumor Number of Lymph Nodes Examined: 23 Tumor Deposits: Not identified Pathologic Stage Classification (pTNM, AJCC 8th Edition) pT Category: pT1 pN Category: pN0  Most recently seen 3/10/23, Patient recovering well from surgery. On exam, incisions are well healed. Stage I splenic flexure cancer, s/p left hemicolectomy. Advised liberating diet and activity. f/u in 1 year for repeat examination. Advised f/u with Gi for surveillance colonoscopy.   Pt reports he's doing well. Denies abd pain, n/v/c/d, but reports stools have been looser since surgery and now has 3 BMs daily. Denies anal pain or bleeding.  Admits to very rare anal irritation and has used Tuck's pads as needed.  Eats some brown rice and bananas but otherwise refined carbs.  Of note, scheduled for surveillance colonoscopy w/ primary GI in July 2024.

## 2024-04-05 LAB — CEA SERPL-MCNC: 3.4 NG/ML

## (undated) DEVICE — XI 12MM AND STAPLER CANNULA SEAL

## (undated) DEVICE — XI ARM GRASPER TIP UP FENESTRATED

## (undated) DEVICE — ELCTR BOVIE PENCIL HANDPIECE ROCKER SWITCH 15FT

## (undated) DEVICE — STOPCOCK 4-WAY

## (undated) DEVICE — DRAPE 1/2 SHEET 40X57"

## (undated) DEVICE — XI ARM NEEDLE DRIVER LARGE

## (undated) DEVICE — SUT VICRYL 0 54" TIES

## (undated) DEVICE — NDL SPINAL 22G X 3.5" (BLACK)

## (undated) DEVICE — DRAPE 3/4 SHEET 52X76"

## (undated) DEVICE — XI TIP COVER

## (undated) DEVICE — MARKING PEN W RULER

## (undated) DEVICE — GLV 8 PROTEXIS (WHITE)

## (undated) DEVICE — XI SEAL UNIV 5- 8 MM

## (undated) DEVICE — SYR LUER LOK 30CC

## (undated) DEVICE — XI DRAPE COLUMN

## (undated) DEVICE — SPONGE ENDO PEANUT 5MM

## (undated) DEVICE — INSUFFLATION NDL COVIDIEN SURGINEEDLE VERESS 120MM

## (undated) DEVICE — LIGASURE BLUNT TIP 37CM

## (undated) DEVICE — SPECIMEN CONTAINER 4OZ

## (undated) DEVICE — PACK GENERAL LAPAROSCOPY

## (undated) DEVICE — XI VESSEL SEALER

## (undated) DEVICE — POSITIONER PINK PAD PIGAZZI SYSTEM XL W ARM PROTECTOR

## (undated) DEVICE — KIT ENDO PROCEDURE CUST W/VLV

## (undated) DEVICE — BLADE SURGICAL #15 CARBON

## (undated) DEVICE — SUT VICRYL 0 18" ENDOLOOP LIGATURE

## (undated) DEVICE — FOLEY TRAY 16FR 5CC LF UMETER CLOSED

## (undated) DEVICE — SUT SILK 3-0 18" SH (POP-OFF)

## (undated) DEVICE — TUBING STRYKER PNEUMOCLEAR HIGH FLOW HEATED

## (undated) DEVICE — SUT PDS II PLUS 1 27" CT-1

## (undated) DEVICE — XI DRAPE ARM

## (undated) DEVICE — TIP METZENBAUM SCISSOR MONOPOLAR ENDOCUT (ORANGE)

## (undated) DEVICE — GLV 7.5 PROTEXIS (WHITE)

## (undated) DEVICE — DRSG DERMABOND 0.7ML

## (undated) DEVICE — Device

## (undated) DEVICE — XI STAPLER SUREFORM 60

## (undated) DEVICE — SUT VICRYL PLUS 0 36" CT-1

## (undated) DEVICE — XI ARM FORCEP FENESTRATED BIPOLAR 8MM

## (undated) DEVICE — PACK GENERAL CLOSING

## (undated) DEVICE — SUT MONOCRYL 4-0 27" PS-2 UNDYED

## (undated) DEVICE — SUT VICRYL 3-0 27" SH

## (undated) DEVICE — DRAPE TOP SHEET 53" X 101"

## (undated) DEVICE — POSITIONER STRAP KNEE & BODY 3X60" DISP

## (undated) DEVICE — XI ARM SCISSOR MONO CURVED

## (undated) DEVICE — XI ENDOWRIST 12 - 8 MM CANNULA REDUCER

## (undated) DEVICE — VENODYNE/SCD SLEEVE CALF MEDIUM

## (undated) DEVICE — PACK PERI GYN

## (undated) DEVICE — WARMING BLANKET UPPER ADULT

## (undated) DEVICE — XI OBTURATOR OPTICAL BLADELESS 8MM

## (undated) DEVICE — STAPLER COVIDIEN ENDO GIA SHORT HANDLE

## (undated) DEVICE — XI ENDOWRIST STAPLER SHEATH

## (undated) DEVICE — D HELP - CLEARVIEW CLEARIFY SYSTEM